# Patient Record
Sex: MALE | Race: WHITE
[De-identification: names, ages, dates, MRNs, and addresses within clinical notes are randomized per-mention and may not be internally consistent; named-entity substitution may affect disease eponyms.]

---

## 2020-01-10 ENCOUNTER — HOSPITAL ENCOUNTER (EMERGENCY)
Dept: HOSPITAL 95 - ER | Age: 85
Discharge: HOME | End: 2020-01-10
Payer: OTHER GOVERNMENT

## 2020-01-10 VITALS — BODY MASS INDEX: 20.04 KG/M2 | HEIGHT: 70 IN | WEIGHT: 139.99 LBS

## 2020-01-10 DIAGNOSIS — B02.9: Primary | ICD-10-CM

## 2020-03-16 ENCOUNTER — HOSPITAL ENCOUNTER (INPATIENT)
Dept: HOSPITAL 95 - ER | Age: 85
LOS: 1 days | Discharge: HOME | DRG: 247 | End: 2020-03-17
Attending: INTERNAL MEDICINE | Admitting: INTERNAL MEDICINE
Payer: OTHER GOVERNMENT

## 2020-03-16 VITALS — BODY MASS INDEX: 21.3 KG/M2 | WEIGHT: 148.81 LBS | HEIGHT: 70 IN

## 2020-03-16 DIAGNOSIS — I10: ICD-10-CM

## 2020-03-16 DIAGNOSIS — I21.3: Primary | ICD-10-CM

## 2020-03-16 DIAGNOSIS — E78.5: ICD-10-CM

## 2020-03-16 LAB
ALBUMIN SERPL BCP-MCNC: 3.1 G/DL (ref 3.4–5)
ALBUMIN/GLOB SERPL: 0.9 {RATIO} (ref 0.8–1.8)
ALT SERPL W P-5'-P-CCNC: 23 U/L (ref 12–78)
ANION GAP SERPL CALCULATED.4IONS-SCNC: 6 MMOL/L (ref 6–16)
AST SERPL W P-5'-P-CCNC: 21 U/L (ref 12–37)
BILIRUB SERPL-MCNC: 0.3 MG/DL (ref 0.1–1)
BUN SERPL-MCNC: 19 MG/DL (ref 8–24)
CA-I BLD-SCNC: 1.09 MMOL/L (ref 1.1–1.46)
CALCIUM SERPL-MCNC: 8.5 MG/DL (ref 8.5–10.1)
CHLORIDE BLD-SCNC: 102 MMOL/L (ref 98–108)
CHLORIDE SERPL-SCNC: 104 MMOL/L (ref 98–108)
CHOLEST SERPL-MCNC: 156 MG/DL (ref 50–200)
CHOLEST/HDLC SERPL: 2.3 {RATIO}
CO2 BLD-SCNC: 28 MMOL/L (ref 21–32)
CO2 SERPL-SCNC: 30 MMOL/L (ref 21–32)
CREAT BLD-MCNC: 0.9 MG/DL (ref 0.8–1.3)
CREAT SERPL-MCNC: 0.94 MG/DL (ref 0.6–1.2)
DEPRECATED RDW RBC AUTO: 49.4 FL (ref 35.1–46.3)
ERYTHROCYTE [DISTWIDTH] IN BLOOD BY AUTOMATED COUNT: 12.7 % (ref 11.7–14.2)
GLOBULIN SER CALC-MCNC: 3.3 G/DL (ref 2.2–4)
GLUCOSE BLDC GLUCOMTR-MCNC: 135 MG/DL (ref 70–99)
GLUCOSE SERPL-MCNC: 129 MG/DL (ref 70–99)
HCT VFR BLD AUTO: 40.9 % (ref 37–53)
HCT VFR BLD CALC: 39 % (ref 41–53)
HDLC SERPL-MCNC: 68 MG/DL (ref 39–?)
HGB BLD CALC-MCNC: 13.3 G/DL (ref 13.5–17.5)
HGB BLD-MCNC: 13.6 G/DL (ref 13.5–17.5)
LDLC SERPL CALC-MCNC: 72 MG/DL (ref 0–110)
LDLC/HDLC SERPL: 1.1 {RATIO}
MAGNESIUM SERPL-MCNC: 2.1 MG/DL (ref 1.6–2.4)
MCHC RBC AUTO-ENTMCNC: 33.3 G/DL (ref 31.5–36.5)
MCV RBC AUTO: 104 FL (ref 80–100)
NRBC # BLD AUTO: 0 K/MM3 (ref 0–0.02)
NRBC BLD AUTO-RTO: 0 /100 WBC (ref 0–0.2)
PLATELET # BLD AUTO: 123 K/MM3 (ref 150–400)
POTASSIUM BLD-SCNC: 4.3 MMOL/L (ref 3.5–5.5)
POTASSIUM SERPL-SCNC: 4.3 MMOL/L (ref 3.5–5.5)
PROT SERPL-MCNC: 6.4 G/DL (ref 6.4–8.2)
SODIUM BLD-SCNC: 136 MMOL/L (ref 135–148)
SODIUM SERPL-SCNC: 140 MMOL/L (ref 136–145)
TRIGL SERPL-MCNC: 78 MG/DL (ref 30–160)
TROPONIN I SERPL-MCNC: 0.12 NG/ML (ref 0–0.04)
VLDLC SERPL CALC-MCNC: 15 MG/DL (ref 6–32)

## 2020-03-16 PROCEDURE — C1894 INTRO/SHEATH, NON-LASER: HCPCS

## 2020-03-16 PROCEDURE — A9270 NON-COVERED ITEM OR SERVICE: HCPCS

## 2020-03-16 PROCEDURE — C9600 PERC DRUG-EL COR STENT SING: HCPCS

## 2020-03-16 PROCEDURE — C1769 GUIDE WIRE: HCPCS

## 2020-03-16 PROCEDURE — C9601 PERC DRUG-EL COR STENT BRAN: HCPCS

## 2020-03-16 PROCEDURE — B2111ZZ FLUOROSCOPY OF MULTIPLE CORONARY ARTERIES USING LOW OSMOLAR CONTRAST: ICD-10-PCS | Performed by: INTERNAL MEDICINE

## 2020-03-16 PROCEDURE — 02713ZZ DILATION OF CORONARY ARTERY, TWO ARTERIES, PERCUTANEOUS APPROACH: ICD-10-PCS | Performed by: INTERNAL MEDICINE

## 2020-03-16 PROCEDURE — C1753 CATH, INTRAVAS ULTRASOUND: HCPCS

## 2020-03-16 PROCEDURE — 027135Z DILATION OF CORONARY ARTERY, TWO ARTERIES WITH TWO DRUG-ELUTING INTRALUMINAL DEVICES, PERCUTANEOUS APPROACH: ICD-10-PCS | Performed by: INTERNAL MEDICINE

## 2020-03-16 PROCEDURE — C9606 PERC D-E COR REVASC W AMI S: HCPCS

## 2020-03-16 PROCEDURE — C1725 CATH, TRANSLUMIN NON-LASER: HCPCS

## 2020-03-16 PROCEDURE — C1887 CATHETER, GUIDING: HCPCS

## 2020-03-16 PROCEDURE — 4A023N7 MEASUREMENT OF CARDIAC SAMPLING AND PRESSURE, LEFT HEART, PERCUTANEOUS APPROACH: ICD-10-PCS | Performed by: INTERNAL MEDICINE

## 2020-03-16 PROCEDURE — C1874 STENT, COATED/COV W/DEL SYS: HCPCS

## 2020-03-16 NOTE — NUR
1445 PT ADMITTED VIA BED TO ICU-6.  PT A/O, DENIES PAIN OR DISTRESS CURRENTLY.
 SR WTIH BBB.  IV SITES BILATERALLY AND NS AT 200ML TIMES 500ML TO FOLLOW.
ECHO IS IN PROGRESS.  PT DAUGHTER HAS CALLED IN AND WILL F/U WITH STATUS
REPORT.

## 2020-03-16 NOTE — NUR
1730 FIRST ATTEMPT TO DEFLATE TR CUFF WAS MADE AND FULLY DEFLATED BUT
SUSPECTED HEMATOMA WAS NOTED.  RE-INFLATED TO ONLY 8ML AT TR FROM ORIGIANL
12ML AND SITE IS STABLE.  SEE MED LIST.   PT DENIES PAIN BUT CONTINUES TO BURP
AND FEEL RELIEF AS WELL AS PASS GAS.  PT VS NOTED.  SET UP TO EAT SUPPER AND
FED SELF WITH FINGER FOODS.  NS BOLUS COMPLETE.  PT REMAINS SR WITH BBB.  WILL
REPORT TR TO NOC SHIFT TO ATTEMPT TO DEFLATE. I/O NOTED.

## 2020-03-16 NOTE — NUR
RECIEVED BEDSIDE REPORT FORM PAUL, OFF GOING RN. MONITOR INTACT SHOWING SINUS
RHYTHM. HEART RATE 84. TR BAND IN PLACE.
 
8CC AIR IN BAND SITE CLEAR NON TENDER.
LUNGS SOUNDS CLEAR WITH DECREASED SOUNDS IN BASES. RESPIRATIONS REGULAR AND
EASY ON ROOM AIR SPO2 96-98% ABDOMEN SOFT WITH BOWEL SOUNDS FOUR QUADS. VOIDS
JEANNINE URINE PER UNINAL. PASSING FLATUS . CO FACIAL NUMBNESS/TINGLING STATES "
IT'S BEEN THERE SINCE I HAD THE SHINGLES IN MY EYE. CONTINUE TO MONITOR AND
REPORT CHANGE IN PATIENT CONDITION.

## 2020-03-17 LAB
ANION GAP SERPL CALCULATED.4IONS-SCNC: 5 MMOL/L (ref 6–16)
BASOPHILS # BLD AUTO: 0.01 K/MM3 (ref 0–0.23)
BASOPHILS NFR BLD AUTO: 0 % (ref 0–2)
BUN SERPL-MCNC: 14 MG/DL (ref 8–24)
CALCIUM SERPL-MCNC: 8.2 MG/DL (ref 8.5–10.1)
CHLORIDE SERPL-SCNC: 106 MMOL/L (ref 98–108)
CO2 SERPL-SCNC: 28 MMOL/L (ref 21–32)
CREAT SERPL-MCNC: 0.83 MG/DL (ref 0.6–1.2)
DEPRECATED RDW RBC AUTO: 49.7 FL (ref 35.1–46.3)
EOSINOPHIL # BLD AUTO: 0.05 K/MM3 (ref 0–0.68)
EOSINOPHIL NFR BLD AUTO: 1 % (ref 0–6)
ERYTHROCYTE [DISTWIDTH] IN BLOOD BY AUTOMATED COUNT: 12.9 % (ref 11.7–14.2)
GLUCOSE SERPL-MCNC: 113 MG/DL (ref 70–99)
HCT VFR BLD AUTO: 39.9 % (ref 37–53)
HGB BLD-MCNC: 13.2 G/DL (ref 13.5–17.5)
IMM GRANULOCYTES # BLD AUTO: 0.02 K/MM3 (ref 0–0.1)
IMM GRANULOCYTES NFR BLD AUTO: 0 % (ref 0–1)
LYMPHOCYTES # BLD AUTO: 0.72 K/MM3 (ref 0.84–5.2)
LYMPHOCYTES NFR BLD AUTO: 9 % (ref 21–46)
MCHC RBC AUTO-ENTMCNC: 33.1 G/DL (ref 31.5–36.5)
MCV RBC AUTO: 103 FL (ref 80–100)
MONOCYTES # BLD AUTO: 0.68 K/MM3 (ref 0.16–1.47)
MONOCYTES NFR BLD AUTO: 8 % (ref 4–13)
NEUTROPHILS # BLD AUTO: 6.6 K/MM3 (ref 1.96–9.15)
NEUTROPHILS NFR BLD AUTO: 82 % (ref 41–73)
NRBC # BLD AUTO: 0 K/MM3 (ref 0–0.02)
NRBC BLD AUTO-RTO: 0 /100 WBC (ref 0–0.2)
PLATELET # BLD AUTO: 155 K/MM3 (ref 150–400)
POTASSIUM SERPL-SCNC: 4.1 MMOL/L (ref 3.5–5.5)
SODIUM SERPL-SCNC: 139 MMOL/L (ref 136–145)

## 2020-03-17 NOTE — NUR
SHIFT SUMMARY : RESTSQUIETLY WHEN UNDISTURBED MONITOR INTACT SHOWING SINUS
RHYTHM WITH BUNDLE BRANCH BLOCK HEART RATE 80'S. DENIES DISCOMFORT LUNG SOUNDS
CLEAR RESPIRATIONS REGULAR AND EASY. SP2 94-98% ABDOMEN SOFT WITH BOWEL SOUNDS
FOUR QUADS VOIDS JEANNINE URINE PER URINAL. TR BAND SITE CLEAR  CONTINUE TO
MONITOR AND REPORT CHANGE IN PATIENT CONDITION

## 2020-03-17 NOTE — NUR
PT ASSESED BY MD-DISCHARGE ORDERS GIVEN.
PT UP OUT OF BED AMBULATED WELL IN ROOM, DENIES PAIN, N/V, SOB. UP AND DRESSED
GIVEN DISCHARGE INSTRUCTIONS, PT VERBALIZED AN UNDERSTANDING OF INSTRUCTIONS.
PT TAKEN TO WAITING FAMILY VIA W/C @1202PM